# Patient Record
(demographics unavailable — no encounter records)

---

## 2024-10-28 NOTE — PLAN
[FreeTextEntry1] : #Annual - Pap: done, f/u results - STI testing: GC/CT sent via thinprep, f/u results - Influenza: due, patient will receive at later date - COVID: due, patient will receive at later date - Gardasil: s/p - Contraception: LNG IUD, due for replacement next summer. Reviewed process of replacement, patient will call our office 3 months prior to desired replacement so that new Mirena IUD can be ordered at that time  RTC PRN or for IUD replacement in summer 2025

## 2024-10-28 NOTE — PHYSICAL EXAM
[Chaperone Present] : A chaperone was present in the examining room during all aspects of the physical examination [[id number]] : A chaperone was present during the pelvic exam. [Appropriately responsive] : appropriately responsive [Alert] : alert [No Acute Distress] : no acute distress [Soft] : soft [Non-tender] : non-tender [Non-distended] : non-distended [No HSM] : No HSM [No Lesions] : no lesions [No Mass] : no mass [Oriented x3] : oriented x3 [Examination Of The Breasts] : a normal appearance [No Masses] : no breast masses were palpable [Labia Majora] : normal [Labia Minora] : normal [Normal] : normal [Uterine Adnexae] : normal [FreeTextEntry2] : Rhonda Hatch

## 2024-10-28 NOTE — HISTORY OF PRESENT ILLNESS
[Patient reported PAP Smear was normal] : Patient reported PAP Smear was normal [LMP unknown] : LMP unknown [Y] : Patient uses contraception [IUD] : has an intrauterine device [N] : Patient denies prior pregnancies [unknown] : Patient is unsure of the date of her LMP [Spotting Between  Menses] : spotting reported between menses [Menarche Age: ____] : age at menarche was [unfilled] [Currently Active] : currently active [Men] : men [No] : No [Yes] : Yes [Patient would like to be screened for STIs] : Patient would like to be screened for STIs [FreeTextEntry1] : Aruna Cruz 29yo presents for an annual exam. Patient states she currently has a hormonal IUD that was inserted in 2020 in [place], she was told to replace it at the 5 year gini which would be next summer.  PMH: Asthma PSH: Childhood kidney surgery Meds: Inhalers, Accutane Allergies: NKDA Social Hx: Social alcohol use. Works as a postdoc. [PapSmeardate] : 2022 [TextBox_31] : as per patient [de-identified] : Mirena [FreeTextEntry3] : Mirena IUD

## 2025-07-18 NOTE — PROCEDURE
[IUD Placement] : intrauterine device (IUD) placement [Prevention of Pregnancy] : prevention of pregnancy [Emergency Contraception] : emergency contraception [Risks] : risks [Benefits] : benefits [Alternatives] : alternatives [IUD Removal] : intrauterine device (IUD) removal [Neg Pregnancy Test] : negative pregnancy test [Neg GC/Chlamydia] : negative GC/Chlamydia [Kyleena IUD] : Kyleena IUD [de-identified] : [id number] [de-identified] : [id number] [de-identified] : 01/2027